# Patient Record
Sex: FEMALE | Race: WHITE | Employment: OTHER | URBAN - METROPOLITAN AREA
[De-identification: names, ages, dates, MRNs, and addresses within clinical notes are randomized per-mention and may not be internally consistent; named-entity substitution may affect disease eponyms.]

---

## 2017-03-02 ENCOUNTER — OFFICE VISIT (OUTPATIENT)
Dept: ENDOCRINOLOGY | Age: 72
End: 2017-03-02

## 2017-03-02 VITALS
TEMPERATURE: 95.8 F | BODY MASS INDEX: 26.58 KG/M2 | DIASTOLIC BLOOD PRESSURE: 68 MMHG | HEART RATE: 70 BPM | OXYGEN SATURATION: 96 % | RESPIRATION RATE: 18 BRPM | WEIGHT: 150 LBS | HEIGHT: 63 IN | SYSTOLIC BLOOD PRESSURE: 167 MMHG

## 2017-03-02 DIAGNOSIS — R53.83 FATIGUE, UNSPECIFIED TYPE: ICD-10-CM

## 2017-03-02 DIAGNOSIS — R73.02 GLUCOSE INTOLERANCE (IMPAIRED GLUCOSE TOLERANCE): ICD-10-CM

## 2017-03-02 DIAGNOSIS — E04.2 MULTINODULAR GOITER: Primary | ICD-10-CM

## 2017-03-02 RX ORDER — BUDESONIDE AND FORMOTEROL FUMARATE DIHYDRATE 160; 4.5 UG/1; UG/1
AEROSOL RESPIRATORY (INHALATION)
Refills: 3 | COMMUNITY
Start: 2016-12-16

## 2017-03-02 NOTE — PROGRESS NOTES
Wt Readings from Last 3 Encounters:   03/02/17 150 lb (68 kg)   08/31/16 143 lb (64.9 kg)   05/25/16 142 lb (64.4 kg)     Temp Readings from Last 3 Encounters:   03/02/17 95.8 °F (35.4 °C) (Oral)   08/31/16 98.4 °F (36.9 °C) (Oral)   05/25/16 95.5 °F (35.3 °C) (Oral)     BP Readings from Last 3 Encounters:   03/02/17 167/68   08/31/16 135/66   05/25/16 131/57     Pulse Readings from Last 3 Encounters:   03/02/17 70   08/31/16 74   05/25/16 73     Lab Results   Component Value Date/Time    TSH 2.530 02/23/2017 08:22 AM    T4, Free 1.27 02/23/2017 08:22 AM

## 2017-03-02 NOTE — MR AVS SNAPSHOT
Visit Information Date & Time Provider Department Dept. Phone Encounter #  
 3/2/2017  1:30 PM Lucía Luna MD [facility] [phone] [id number] Follow-up Instructions Return in about 6 months (around 9/2/2017). Upcoming Health Maintenance Date Due DTaP/Tdap/Td series (1 - Tdap) 9/15/1966 BREAST CANCER SCRN MAMMOGRAM 9/15/1995 FOBT Q 1 YEAR AGE 50-75 9/15/1995 ZOSTER VACCINE AGE 60> 9/15/2005 GLAUCOMA SCREENING Q2Y 9/15/2010 OSTEOPOROSIS SCREENING (DEXA) 9/15/2010 Pneumococcal 65+ Low/Medium Risk (1 of 2 - PCV13) 9/15/2010 MEDICARE YEARLY EXAM 9/15/2010 INFLUENZA AGE 9 TO ADULT 8/1/2016 Allergies as of 3/2/2017  Review Complete On: 3/2/2017 By: Lucía Luna MD  
  
 Severity Noted Reaction Type Reactions Cat Dander  03/02/2016    Other (comments) Lactose  01/20/2011   Intolerance Other (comments) BLOATED AND DIARRHIA. 8/12/14: update; PT STATES ABLE TO TAKE YOGURT WITHOUT ANY PROBLEMS. Current Immunizations  Reviewed on 8/24/2015 No immunizations on file. Not reviewed this visit Vitals BP  
  
  
  
  
  
 167/68 BMI and BSA Data Body Mass Index Body Surface Area  
 26.57 kg/m 2 1.74 m 2 Preferred Pharmacy Pharmacy Name Phone [id number], [address] [facility] [phone] Your Updated Medication List  
  
   
This list is accurate as of: 3/2/17  1:56 PM.  Always use your most recent med list.  
  
  
  
  
 CRESTOR 10 mg tablet Generic drug:  rosuvastatin Take 10 mg by mouth daily. TAKES IN AM  
  
 ELIQUIS 5 mg tablet Generic drug:  apixaban Take 5 mg by mouth two (2) times a day. levothyroxine 25 mcg tablet Commonly known as:  SYNTHROID Take 1 tablet Monday-Thursday. Skip Friday, Saturday, and Sunday  
  
 lisinopril 30 mg tablet Commonly known as:  Malissa Peña Take 30 mg by mouth daily.  TAKES IN AM  
  
 OTHER Affrin  
  
 pentoxifylline  mg CR tablet Commonly known as:  TRENTAL  
  
 SYMBICORT 160-4.5 mcg/actuation HFA inhaler Generic drug:  budesonide-formoterol INHALE 2 PUFFS BY MOUTH TWICE A DAY Follow-up Instructions Return in about 6 months (around 9/2/2017). Patient Instructions Stay on synthrodi  25 mcg mon through Thursday Stop on Friday, Saturday and sunday Introducing [facility]! Dear Jacqueline Tom: 
Thank you for requesting a [employer] account. Our records indicate that you have previously registered for a [employer] account but its currently inactive. Please call our [employer] support line at [phone]. Additional Information If you have questions, please visit the Frequently Asked Questions section of the [employer] website at [email or web address] Remember, [employer] is NOT to be used for urgent needs. For medical emergencies, dial 911. Now available from your iPhone and Android! Please provide this summary of care documentation to your next provider. Your primary care clinician is listed as Delbert Ac. If you have any questions after today's visit, please call [phone].
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
## 2017-06-14 RX ORDER — LEVOTHYROXINE SODIUM 25 UG/1
TABLET ORAL
Qty: 20 TAB | Refills: 6 | Status: SHIPPED | OUTPATIENT
Start: 2017-06-14 | End: 2017-07-13 | Stop reason: SDUPTHER

## 2017-07-13 RX ORDER — LEVOTHYROXINE SODIUM 25 UG/1
TABLET ORAL
Qty: 90 TAB | Refills: 4 | Status: SHIPPED | OUTPATIENT
Start: 2017-07-13